# Patient Record
Sex: FEMALE | Race: WHITE | NOT HISPANIC OR LATINO | RURAL
[De-identification: names, ages, dates, MRNs, and addresses within clinical notes are randomized per-mention and may not be internally consistent; named-entity substitution may affect disease eponyms.]

---

## 2017-08-09 ENCOUNTER — OFFICE VISIT (OUTPATIENT)
Dept: RETAIL CLINIC | Facility: CLINIC | Age: 52
End: 2017-08-09
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
## 2017-08-09 PROCEDURE — 99213 OFFICE O/P EST LOW 20 MIN: CPT | Performed by: NURSE PRACTITIONER

## 2017-08-09 RX ORDER — DOXYCYCLINE 100 MG/1
CAPSULE ORAL
Qty: 6 CAPSULE | Refills: 0 | Status: SHIPPED | OUTPATIENT
Start: 2017-08-09 | End: 2017-08-11

## 2017-08-09 NOTE — PROGRESS NOTES
Subjective   Maryan Wei is a 52 y.o. female.   Pulse 76  Temp 98.4 °F (36.9 °C)  Resp 14  SpO2 98%      History of Present Illness   Pt present to clinic with tick bite and concern of lyme disease. States that she started to feel tired and fatigue and then noticed a tick on her back. After pulling she developed a redness around the bite that was slightly sensitive.     The following portions of the patient's history were reviewed and updated as appropriate: allergies, current medications, past family history, past medical history, past social history, past surgical history and problem list.    Review of Systems   Constitutional: Positive for fatigue. Negative for activity change, appetite change and fever.   HENT: Negative for congestion, rhinorrhea, sinus pressure and sore throat.    Respiratory: Negative for cough and wheezing.    Cardiovascular: Negative for chest pain and palpitations.   Gastrointestinal: Negative for nausea and vomiting.   Musculoskeletal: Positive for arthralgias.   Skin: Positive for rash.   Neurological: Negative for dizziness, syncope, speech difficulty, weakness, numbness and headaches.   Psychiatric/Behavioral: Negative for confusion.       Objective   Physical Exam   Constitutional: She appears well-developed and well-nourished.   HENT:   Head: Normocephalic and atraumatic.   Cardiovascular: Regular rhythm and normal heart sounds.    Pulmonary/Chest: Effort normal. She has no wheezes. She has no rhonchi. She has no rales.   Lymphadenopathy:     She has no cervical adenopathy.   Skin: Skin is warm and dry.            Assessment/Plan   Diagnoses and all orders for this visit:    Tick bite, initial encounter    Other orders  -     doxycycline (MONODOX) 100 MG capsule; 2 tab daily for 3 day        If symptoms continue, go to PCP for further evaluation.

## 2017-08-09 NOTE — PATIENT INSTRUCTIONS
Tick Bite Information  Ticks are insects that attach themselves to the skin and draw blood for food. There are various types of ticks. Common types include wood ticks and deer ticks. Most ticks live in shrubs and grassy areas. Ticks can climb onto your body when you make contact with leaves or grass where the tick is waiting. The most common places on the body for ticks to attach themselves are the scalp, neck, armpits, waist, and groin.  Most tick bites are harmless, but sometimes ticks carry germs that cause diseases. These germs can be spread to a person during the tick's feeding process. The chance of a disease spreading through a tick bite depends on:   · The type of tick.  · Time of year.    · How long the tick is attached.    · Geographic location.    HOW CAN YOU PREVENT TICK BITES?  Take these steps to help prevent tick bites when you are outdoors:  · Wear protective clothing. Long sleeves and long pants are best.    · Wear white clothes so you can see ticks more easily.  · Tuck your pant legs into your socks.    · If walking on a trail, stay in the middle of the trail to avoid brushing against bushes.  · Avoid walking through areas with long grass.   · Put insect repellent on all exposed skin and along boot tops, pant legs, and sleeve cuffs.    · Check clothing, hair, and skin repeatedly and before going inside.    · Brush off any ticks that are not attached.  · Take a shower or bath as soon as possible after being outdoors.     WHAT IS THE PROPER WAY TO REMOVE A TICK?  Ticks should be removed as soon as possible to help prevent diseases caused by tick bites.  1. If latex gloves are available, put them on before trying to remove a tick.    2. Using fine-point tweezers, grasp the tick as close to the skin as possible. You may also use curved forceps or a tick removal tool. Grasp the tick as close to its head as possible. Avoid grasping the tick on its body.  3. Pull gently with steady upward pressure until  the tick lets go. Do not twist the tick or jerk it suddenly. This may break off the tick's head or mouth parts.  4. Do not squeeze or crush the tick's body. This could force disease-carrying fluids from the tick into your body.    5. After the tick is removed, wash the bite area and your hands with soap and water or other disinfectant such as alcohol.  6. Apply a small amount of antiseptic cream or ointment to the bite site.    7. Wash and disinfect any instruments that were used.    Do not try to remove a tick by applying a hot match, petroleum jelly, or fingernail polish to the tick. These methods do not work and may increase the chances of disease being spread from the tick bite.   WHEN SHOULD YOU SEEK MEDICAL CARE?  Contact your health care provider if you are unable to remove a tick from your skin or if a part of the tick breaks off and is stuck in the skin.   After a tick bite, you need to be aware of signs and symptoms that could be related to diseases spread by ticks. Contact your health care provider if you develop any of the following in the days or weeks after the tick bite:  · Unexplained fever.  · Rash. A circular rash that appears days or weeks after the tick bite may indicate the possibility of Lyme disease. The rash may resemble a target with a bull's-eye and may occur at a different part of your body than the tick bite.  · Redness and swelling in the area of the tick bite.    · Tender, swollen lymph glands.    · Diarrhea.    · Weight loss.    · Cough.    · Fatigue.    · Muscle, joint, or bone pain.    · Abdominal pain.    · Headache.    · Lethargy or a change in your level of consciousness.  · Difficulty walking or moving your legs.    · Numbness in the legs.    · Paralysis.  · Shortness of breath.    · Confusion.    · Repeated vomiting.       This information is not intended to replace advice given to you by your health care provider. Make sure you discuss any questions you have with your health  care provider.     Document Released: 12/15/2001 Document Revised: 01/08/2016 Document Reviewed: 05/28/2014  [employer] Interactive Patient Education ©2017 [employer]

## 2017-08-11 VITALS — HEART RATE: 76 BPM | OXYGEN SATURATION: 98 % | RESPIRATION RATE: 14 BRPM | TEMPERATURE: 98.4 F

## 2017-12-18 ENCOUNTER — HOSPITAL ENCOUNTER (OUTPATIENT)
Dept: HOSPITAL 22 - RAD | Age: 52
End: 2017-12-18
Attending: NURSE PRACTITIONER
Payer: COMMERCIAL

## 2017-12-18 DIAGNOSIS — M12.9: Primary | ICD-10-CM

## 2017-12-19 NOTE — RADIOLOGY REPORT PS360
FINGER-RT-4TH (RING)-3 VIEWS
 
COMPARISON: None
 
HISTORY: Pain right fourth finger
 
TECHNIQUE: AP lateral and oblique views
 
FINDINGS: The proximal middle distal phalanx appear intact with no evidence of
recent or old fracture. There is no significant narrowing or spurring of the PIP
or DIP joint which are comparable to the PIP and PIP joints of the third and
little finger also included in the image.. The soft tissues are normal.
 
 
IMPRESSION: Grossly negative right fourth finger

## 2021-07-15 ENCOUNTER — TELEPHONE (OUTPATIENT)
Dept: URGENT CARE | Facility: CLINIC | Age: 56
End: 2021-07-15

## 2022-09-26 ENCOUNTER — AMBULATORY SURGICAL CENTER (OUTPATIENT)
Dept: URBAN - METROPOLITAN AREA SURGERY 10 | Facility: SURGERY | Age: 57
End: 2022-09-26
Payer: MEDICARE

## 2022-09-26 ENCOUNTER — OFFICE (OUTPATIENT)
Dept: URBAN - METROPOLITAN AREA PATHOLOGY 4 | Facility: PATHOLOGY | Age: 57
End: 2022-09-26
Payer: MEDICARE

## 2022-09-26 DIAGNOSIS — Z12.11 ENCOUNTER FOR SCREENING FOR MALIGNANT NEOPLASM OF COLON: ICD-10-CM

## 2022-09-26 DIAGNOSIS — K63.5 POLYP OF COLON: ICD-10-CM

## 2022-09-26 DIAGNOSIS — K64.0 FIRST DEGREE HEMORRHOIDS: ICD-10-CM

## 2022-09-26 DIAGNOSIS — K57.30 DIVERTICULOSIS OF LARGE INTESTINE WITHOUT PERFORATION OR ABS: ICD-10-CM

## 2022-09-26 DIAGNOSIS — K64.1 SECOND DEGREE HEMORRHOIDS: ICD-10-CM

## 2022-09-26 PROCEDURE — 88305 TISSUE EXAM BY PATHOLOGIST: CPT | Performed by: INTERNAL MEDICINE

## 2022-09-26 PROCEDURE — 45385 COLONOSCOPY W/LESION REMOVAL: CPT | Mod: 33 | Performed by: INTERNAL MEDICINE

## 2022-09-27 PROBLEM — Z12.11 SCREENING FOR COLONIC NEOPLASIA: Status: ACTIVE | Noted: 2022-09-27

## 2022-11-16 ENCOUNTER — HOSPITAL ENCOUNTER (OUTPATIENT)
Age: 57
End: 2022-11-16
Payer: COMMERCIAL

## 2022-11-16 DIAGNOSIS — J02.0: Primary | ICD-10-CM

## 2022-11-16 PROCEDURE — U0003 INFECTIOUS AGENT DETECTION BY NUCLEIC ACID (DNA OR RNA); SEVERE ACUTE RESPIRATORY SYNDROME CORONAVIRUS 2 (SARS-COV-2) (CORONAVIRUS DISEASE [COVID-19]), AMPLIFIED PROBE TECHNIQUE, MAKING USE OF HIGH THROUGHPUT TECHNOLOGIES AS DESCRIBED BY CMS-2020-01-R: HCPCS

## 2022-11-16 PROCEDURE — C9803 HOPD COVID-19 SPEC COLLECT: HCPCS

## 2022-11-16 PROCEDURE — 87807 RSV ASSAY W/OPTIC: CPT

## 2022-11-16 PROCEDURE — U0005 INFEC AGEN DETEC AMPLI PROBE: HCPCS

## 2023-02-27 ENCOUNTER — HOSPITAL ENCOUNTER (OUTPATIENT)
Age: 58
End: 2023-02-27
Payer: COMMERCIAL

## 2023-02-27 DIAGNOSIS — M79.672: Primary | ICD-10-CM

## 2023-02-27 PROCEDURE — 73630 X-RAY EXAM OF FOOT: CPT

## 2023-12-31 ENCOUNTER — HOSPITAL ENCOUNTER (EMERGENCY)
Age: 58
Discharge: HOME | End: 2023-12-31
Payer: COMMERCIAL

## 2023-12-31 VITALS
TEMPERATURE: 98.24 F | OXYGEN SATURATION: 95 % | RESPIRATION RATE: 18 BRPM | SYSTOLIC BLOOD PRESSURE: 156 MMHG | HEART RATE: 85 BPM | DIASTOLIC BLOOD PRESSURE: 90 MMHG

## 2023-12-31 VITALS
SYSTOLIC BLOOD PRESSURE: 140 MMHG | OXYGEN SATURATION: 98 % | TEMPERATURE: 98.1 F | DIASTOLIC BLOOD PRESSURE: 77 MMHG | RESPIRATION RATE: 18 BRPM | HEART RATE: 85 BPM

## 2023-12-31 VITALS — BODY MASS INDEX: 29.1 KG/M2

## 2023-12-31 DIAGNOSIS — S82.832A: Primary | ICD-10-CM
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
## 2023-12-31 PROCEDURE — 73610 X-RAY EXAM OF ANKLE: CPT

## 2023-12-31 PROCEDURE — G0463 HOSPITAL OUTPT CLINIC VISIT: HCPCS

## 2023-12-31 PROCEDURE — 99212 OFFICE O/P EST SF 10 MIN: CPT

## 2023-12-31 PROCEDURE — 99204 OFFICE O/P NEW MOD 45 MIN: CPT

## 2024-01-23 ENCOUNTER — HOSPITAL ENCOUNTER (OUTPATIENT)
Age: 59
End: 2024-01-23
Payer: COMMERCIAL

## 2024-01-23 DIAGNOSIS — M25.572: Primary | ICD-10-CM

## 2024-01-23 DIAGNOSIS — M79.671: ICD-10-CM

## 2024-01-23 DIAGNOSIS — M79.672: ICD-10-CM

## 2024-01-23 DIAGNOSIS — M25.571: ICD-10-CM

## 2024-01-23 PROCEDURE — 73610 X-RAY EXAM OF ANKLE: CPT

## 2024-01-23 PROCEDURE — 73630 X-RAY EXAM OF FOOT: CPT

## 2024-01-26 ENCOUNTER — LAB REQUISITION (OUTPATIENT)
Dept: LAB | Facility: HOSPITAL | Age: 59
End: 2024-01-26
Payer: COMMERCIAL

## 2024-01-26 DIAGNOSIS — M65.88 OTHER SYNOVITIS AND TENOSYNOVITIS, OTHER SITE: ICD-10-CM

## 2024-01-26 PROCEDURE — 88304 TISSUE EXAM BY PATHOLOGIST: CPT | Performed by: PLASTIC SURGERY

## 2024-01-29 LAB
CYTO UR: NORMAL
LAB AP CASE REPORT: NORMAL
LAB AP CLINICAL INFORMATION: NORMAL
PATH REPORT.FINAL DX SPEC: NORMAL
PATH REPORT.GROSS SPEC: NORMAL

## 2024-01-30 ENCOUNTER — HOSPITAL ENCOUNTER (OUTPATIENT)
Dept: HOSPITAL 22 - RAD | Age: 59
End: 2024-01-30
Payer: COMMERCIAL

## 2024-01-30 DIAGNOSIS — Z12.31: Primary | ICD-10-CM

## 2024-01-30 PROCEDURE — 77063 BREAST TOMOSYNTHESIS BI: CPT

## 2024-01-30 PROCEDURE — 77067 SCR MAMMO BI INCL CAD: CPT

## 2024-09-28 ENCOUNTER — HOSPITAL ENCOUNTER (EMERGENCY)
Age: 59
Discharge: HOME | End: 2024-09-28
Payer: COMMERCIAL

## 2024-09-28 VITALS
RESPIRATION RATE: 16 BRPM | HEART RATE: 77 BPM | SYSTOLIC BLOOD PRESSURE: 124 MMHG | TEMPERATURE: 97.9 F | DIASTOLIC BLOOD PRESSURE: 75 MMHG | OXYGEN SATURATION: 100 %

## 2024-09-28 VITALS
TEMPERATURE: 97.9 F | OXYGEN SATURATION: 100 % | RESPIRATION RATE: 16 BRPM | SYSTOLIC BLOOD PRESSURE: 124 MMHG | HEART RATE: 77 BPM | DIASTOLIC BLOOD PRESSURE: 75 MMHG

## 2024-09-28 VITALS — BODY MASS INDEX: 28.3 KG/M2

## 2024-09-28 DIAGNOSIS — R22.32: ICD-10-CM
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
## 2024-09-28 DIAGNOSIS — M25.532: Primary | ICD-10-CM

## 2024-09-28 PROCEDURE — G0463 HOSPITAL OUTPT CLINIC VISIT: HCPCS

## 2024-09-28 PROCEDURE — 99213 OFFICE O/P EST LOW 20 MIN: CPT

## 2024-09-28 PROCEDURE — 73110 X-RAY EXAM OF WRIST: CPT

## 2024-09-28 PROCEDURE — 99212 OFFICE O/P EST SF 10 MIN: CPT

## 2025-02-05 ENCOUNTER — HOSPITAL ENCOUNTER (OUTPATIENT)
Dept: HOSPITAL 22 - RAD | Age: 60
Discharge: HOME | End: 2025-02-05
Payer: COMMERCIAL

## 2025-02-05 DIAGNOSIS — R22.1: Primary | ICD-10-CM

## 2025-02-05 PROCEDURE — 76536 US EXAM OF HEAD AND NECK: CPT
